# Patient Record
Sex: FEMALE | Race: BLACK OR AFRICAN AMERICAN | Employment: UNEMPLOYED | URBAN - NONMETROPOLITAN AREA
[De-identification: names, ages, dates, MRNs, and addresses within clinical notes are randomized per-mention and may not be internally consistent; named-entity substitution may affect disease eponyms.]

---

## 2022-05-19 ENCOUNTER — HOSPITAL ENCOUNTER (EMERGENCY)
Age: 22
Discharge: HOME | End: 2022-05-19
Payer: COMMERCIAL

## 2022-05-19 VITALS
OXYGEN SATURATION: 98 % | TEMPERATURE: 97.8 F | SYSTOLIC BLOOD PRESSURE: 138 MMHG | HEART RATE: 109 BPM | DIASTOLIC BLOOD PRESSURE: 84 MMHG | RESPIRATION RATE: 18 BRPM

## 2022-05-19 DIAGNOSIS — J02.9 ACUTE PHARYNGITIS, UNSPECIFIED ETIOLOGY: Primary | ICD-10-CM

## 2022-05-19 LAB
GROUP A STREP CULTURE, REFLEX: NEGATIVE
REFLEX THROAT C + S: NORMAL

## 2022-05-19 PROCEDURE — 99202 OFFICE O/P NEW SF 15 MIN: CPT | Performed by: NURSE PRACTITIONER

## 2022-05-19 PROCEDURE — 99203 OFFICE O/P NEW LOW 30 MIN: CPT

## 2022-05-19 PROCEDURE — 87880 STREP A ASSAY W/OPTIC: CPT

## 2022-05-19 PROCEDURE — 87070 CULTURE OTHR SPECIMN AEROBIC: CPT

## 2022-05-19 RX ORDER — AZITHROMYCIN 250 MG/1
TABLET, FILM COATED ORAL
Qty: 6 TABLET | Refills: 0 | Status: SHIPPED | OUTPATIENT
Start: 2022-05-19

## 2022-05-19 ASSESSMENT — ENCOUNTER SYMPTOMS
SINUS PRESSURE: 0
SHORTNESS OF BREATH: 0
NAUSEA: 0
DIARRHEA: 0
SORE THROAT: 1
BACK PAIN: 0
VOMITING: 0
COUGH: 0
RHINORRHEA: 0
TROUBLE SWALLOWING: 0

## 2022-05-19 ASSESSMENT — PAIN - FUNCTIONAL ASSESSMENT: PAIN_FUNCTIONAL_ASSESSMENT: NONE - DENIES PAIN

## 2022-05-19 NOTE — ED PROVIDER NOTES
[facility] 36  Urgent Care Encounter       CHIEF COMPLAINT       Chief Complaint   Patient presents with    Pharyngitis     x3 days       Nurses Notes reviewed and I agree except as noted in the HPI. HISTORY OF PRESENT ILLNESS   Mika Lutz is a 22 y.o. female who presents the urgent care center complaining of a sore throat for the past 3 days. Patient denies any nausea or vomiting. Patient denies any neck pain or stiffness. Patient at the present time sitting on table skin is warm and dry does not appear to be in any acute distress. The history is provided by the patient. No  was used. Pharyngitis  Location:  Generalized  Quality:  Sore  Severity:  Mild  Onset quality:  Sudden  Duration:  3 days  Timing:  Rare  Progression:  Worsening  Chronicity:  New  Relieved by:  Nothing  Worsened by:  Drinking and swallowing  Ineffective treatments:  None tried  Associated symptoms: no adenopathy, no chest pain, no chills, no cough, no ear pain, no fever, no headaches, no neck stiffness, no rash, no rhinorrhea, no shortness of breath and no trouble swallowing    Risk factors: no exposure to strep and no sick contacts        REVIEW OF SYSTEMS     Review of Systems   Constitutional: Negative for activity change, appetite change, chills, fatigue and fever. HENT: Positive for sore throat. Negative for congestion, ear pain, rhinorrhea, sinus pressure and trouble swallowing. Respiratory: Negative for cough and shortness of breath. Cardiovascular: Negative for chest pain. Gastrointestinal: Negative for diarrhea, nausea and vomiting. Musculoskeletal: Negative for back pain and neck stiffness. Skin: Negative for rash. Allergic/Immunologic: Negative for environmental allergies. Neurological: Negative for dizziness, light-headedness and headaches. Hematological: Negative for adenopathy.        PAST MEDICAL HISTORY         Diagnosis Date    Eczema     Psoriasis     Visual acuity reduced     poor visual acuity       SURGICALHISTORY     Patient  has no past surgical history on file. CURRENT MEDICATIONS       Previous Medications    CLINDAMYCIN (CLEOCIN-T) 1 % EXTERNAL SOLUTION    Apply topically 2 times daily as needed for acne. CLINDAMYCIN-BENZOYL PEROXIDE (BENZACLIN) 1-5 % GEL    Apply topically 2 times daily. ALLERGIES     Patient is is allergic to antibiotic [neomycin-polymyxin b gu] and dust mite extract. Patients   Immunization History   Administered Date(s) Administered    DTaP 2001, 01/28/2002, 08/27/2002, 12/17/2002, 03/26/2007    HPV Quadrivalent (Gardasil) 09/17/2014, 11/17/2014    Hepatitis A 07/31/2012, 09/17/2014    Hepatitis B 2001, 2001, 08/27/2002    Hib, unspecified 2001, 01/28/2002, 08/27/2002, 12/17/2002    Influenza Virus Vaccine 09/17/2014    MMR 08/27/2002, 03/26/2007    Meningococcal MCV4P (Menactra) 09/17/2014    Pneumococcal Conjugate 7-valent (Cecilia Bollard) 01/28/2002, 08/27/2002, 12/17/2002    Polio IPV (IPOL) 2001, 01/28/2002, 08/27/2002, 03/26/2007    Tdap (Boostrix, Adacel) 09/17/2014    Varicella (Varivax) 08/27/2002, 10/06/2009       FAMILY HISTORY     Patient's family history is not on file. SOCIAL HISTORY     Patient  reports that she has never smoked. She has never used smokeless tobacco. She reports that she does not drink alcohol and does not use drugs. PHYSICAL EXAM     ED TRIAGE VITALS  BP: 138/84, Temp: 97.8 °F (36.6 °C), Pulse: 109, Resp: 18, SpO2: 98 %,Estimated body mass index is 21.63 kg/m² as calculated from the following:    Height as of 12/20/16: 5' 5\" (1.651 m). Weight as of 12/20/16: 130 lb (59 kg). ,No LMP recorded. Physical Exam  Vitals and nursing note reviewed. Constitutional:       General: She is not in acute distress. Appearance: Normal appearance. She is well-developed and well-groomed.  She is not ill-appearing, toxic-appearing or diaphoretic. HENT:      Head: Normocephalic. Right Ear: Hearing, tympanic membrane, ear canal and external ear normal. No drainage, swelling or tenderness. No mastoid tenderness. Tympanic membrane is not erythematous. Left Ear: Hearing, tympanic membrane, ear canal and external ear normal. No drainage, swelling or tenderness. No mastoid tenderness. Tympanic membrane is not erythematous. Nose: Nose normal. No mucosal edema or congestion. Right Sinus: No maxillary sinus tenderness or frontal sinus tenderness. Left Sinus: No maxillary sinus tenderness or frontal sinus tenderness. Mouth/Throat:      Lips: Pink. Mouth: Mucous membranes are moist. No oral lesions or angioedema. Pharynx: Uvula midline. Posterior oropharyngeal erythema present. No uvula swelling. Tonsils: No tonsillar exudate or tonsillar abscesses. Eyes:      Conjunctiva/sclera: Conjunctivae normal.      Pupils: Pupils are equal, round, and reactive to light. Cardiovascular:      Rate and Rhythm: Regular rhythm. Tachycardia present. Heart sounds: Normal heart sounds. Pulmonary:      Effort: Pulmonary effort is normal. No accessory muscle usage. Breath sounds: Normal breath sounds. No decreased breath sounds, wheezing, rhonchi or rales. Chest:   Breasts:      Right: No supraclavicular adenopathy. Left: No supraclavicular adenopathy. Abdominal:      General: Bowel sounds are normal.      Palpations: Abdomen is soft. Tenderness: There is no abdominal tenderness. There is no right CVA tenderness, left CVA tenderness or guarding. Negative signs include Paz's sign. Musculoskeletal:         General: Normal range of motion. Cervical back: Full passive range of motion without pain, normal range of motion and neck supple. No rigidity. No muscular tenderness. Normal range of motion.    Lymphadenopathy:      Head:      Right side of head: No submental, submandibular, tonsillar, preauricular, posterior auricular or occipital adenopathy. Left side of head: No submental, submandibular, tonsillar, preauricular, posterior auricular or occipital adenopathy. Cervical: No cervical adenopathy. Right cervical: No superficial, deep or posterior cervical adenopathy. Left cervical: No superficial, deep or posterior cervical adenopathy. Upper Body:      Right upper body: No supraclavicular adenopathy. Left upper body: No supraclavicular adenopathy. Skin:     General: Skin is warm and dry. Capillary Refill: Capillary refill takes less than 2 seconds. Findings: No rash. Neurological:      Mental Status: She is alert and oriented to person, place, and time. Psychiatric:         Mood and Affect: Mood normal.         Behavior: Behavior normal. Behavior is cooperative. DIAGNOSTIC RESULTS     Labs:  Results for orders placed or performed during the hospital encounter of 05/19/22   STREP A ANTIGEN   Result Value Ref Range    GROUP A STREP CULTURE, REFLEX Negative        She declined COVID testing today      IMAGING:    No orders to display         EKG:      URGENT CARE COURSE:     Vitals:    05/19/22 1612   BP: 138/84   Pulse: 109   Resp: 18   Temp: 97.8 °F (36.6 °C)   TempSrc: Tympanic   SpO2: 98%       Medications - No data to display         PROCEDURES:  None    FINAL IMPRESSION      1. Acute pharyngitis, unspecified etiology          DISPOSITION/ PLAN      The patient was advised to take medication as directed. The patient was also advised to drink lots of fluids, monitor urine output for hydration status or dark colored urine. The patient could take Motrin or Tylenol for comfort, pain and fever. The Patient/Patient representative was advised to monitor for any changes such as fever not relieved with Motrin or Tylenol.   Also monitor for any difficulty swallowing, neck pain or stiffness, increase in swollen glands, the development of rash or any other concerns they are to dial 911 or go to the emergency department for reevaluation and further management. If the patient does not experience any of the above symptoms now to follow-up with her primary care provider for reevaluation in 3-5 days. The patient/Patient representative are agreeable to the treatment plan at this time the patient is not in acute distress and the patient left in stable condition. PATIENT REFERRED TO:  Oralia Soares MD  [address] [facility] / [address] [address]      DISCHARGE MEDICATIONS:  New Prescriptions    AZITHROMYCIN (ZITHROMAX Z-CHRISTEL) 250 MG TABLET    2 tablets day 1 then1 tablet days 2 - 5.        Discontinued Medications    No medications on file       Current Discharge Medication List          [address] [place] [address]    (Please note that portions of this note were completed with a voice recognition program. Efforts were made to edit the dictations but occasionally words are mis-transcribed.)           LETY Mitchell - CNP  05/19/22 6120

## 2022-05-21 LAB — THROAT/NOSE CULTURE: NORMAL

## 2022-09-02 ENCOUNTER — HOSPITAL ENCOUNTER (EMERGENCY)
Age: 22
Discharge: HOME | End: 2022-09-02
Payer: COMMERCIAL

## 2022-09-02 VITALS
DIASTOLIC BLOOD PRESSURE: 79 MMHG | TEMPERATURE: 97 F | SYSTOLIC BLOOD PRESSURE: 138 MMHG | OXYGEN SATURATION: 99 % | HEART RATE: 78 BPM | RESPIRATION RATE: 16 BRPM

## 2022-09-02 DIAGNOSIS — T63.444A BEE STING, UNDETERMINED INTENT, INITIAL ENCOUNTER: Primary | ICD-10-CM

## 2022-09-02 PROCEDURE — 99213 OFFICE O/P EST LOW 20 MIN: CPT

## 2022-09-02 PROCEDURE — 99213 OFFICE O/P EST LOW 20 MIN: CPT | Performed by: NURSE PRACTITIONER

## 2022-09-02 ASSESSMENT — ENCOUNTER SYMPTOMS
EYE DISCHARGE: 0
EYE REDNESS: 0
ALLERGIC/IMMUNOLOGIC NEGATIVE: 1
WHEEZING: 0
SORE THROAT: 0
VOMITING: 0
TROUBLE SWALLOWING: 0
ABDOMINAL PAIN: 0
COUGH: 0
DIARRHEA: 0
CONSTIPATION: 0
RHINORRHEA: 0
SHORTNESS OF BREATH: 0
BACK PAIN: 0
NAUSEA: 0
EYE PAIN: 0

## 2022-09-02 ASSESSMENT — PAIN - FUNCTIONAL ASSESSMENT: PAIN_FUNCTIONAL_ASSESSMENT: NONE - DENIES PAIN

## 2022-09-02 NOTE — ED TRIAGE NOTES
Patient presents to [facility] with mother with complaints of bee sting that happened today while asleep. Patient states she has a bee sting on the left index finger, and the back of the head.

## 2022-09-02 NOTE — Clinical Note
Mandi Decker was seen and treated in our emergency department on 9/2/2022. She may return to work on 09/02/2022. Patient may return to work today 5PM.      If you have any questions or concerns, please don't hesitate to call.       Maria Elena Soto, LETY - CNP

## 2022-09-02 NOTE — ED PROVIDER NOTES
[facility] 36  Urgent Care Encounter      CHIEF COMPLAINT       Chief Complaint   Patient presents with    Insect Bite     Back of the head and left index finger         Nurses Notes reviewed and I agree except as noted in the HPI. HISTORY OF PRESENT ILLNESS   Krissy Andersen is a 25 y.o. female who presents with complaint of having been started by be on her left index finger and the back of her head about 3 hours ago. Patient states she has very severe allergic reaction to bees. Is missing work today. REVIEW OF SYSTEMS     Review of Systems   Constitutional:  Negative for activity change, fatigue and fever. HENT:  Negative for congestion, ear pain, rhinorrhea, sore throat and trouble swallowing. Eyes:  Negative for pain, discharge and redness. Respiratory:  Negative for cough, shortness of breath and wheezing. Cardiovascular: Negative. Gastrointestinal:  Negative for abdominal pain, constipation, diarrhea, nausea and vomiting. Endocrine: Negative. Genitourinary:  Negative for dysuria, frequency and urgency. Musculoskeletal:  Negative for arthralgias, back pain and myalgias. Skin:  Positive for wound. Negative for rash. Allergic/Immunologic: Negative. Neurological:  Negative for dizziness, tremors, weakness and headaches. Hematological: Negative. Psychiatric/Behavioral:  Negative for dysphoric mood and sleep disturbance. The patient is not nervous/anxious. PAST MEDICAL HISTORY         Diagnosis Date    Eczema     Psoriasis     Visual acuity reduced     poor visual acuity       SURGICAL HISTORY     Patient  has no past surgical history on file.     CURRENT MEDICATIONS       Discharge Medication List as of 9/2/2022  3:45 PM        CONTINUE these medications which have NOT CHANGED    Details   azithromycin (ZITHROMAX Z-CHRISTEL) 250 MG tablet 2 tablets day 1 then1 tablet days 2 - 5., Disp-6 tablet, R-0Normal      clindamycin (CLEOCIN-T) 1 % external solution Capillary Refill: Capillary refill takes less than 2 seconds. Findings: No rash. Neurological:      Mental Status: She is alert and oriented to person, place, and time. GCS: GCS eye subscore is 4. GCS verbal subscore is 5. GCS motor subscore is 6. Cranial Nerves: No cranial nerve deficit. Coordination: Coordination normal.      Gait: Gait normal.   Psychiatric:         Mood and Affect: Mood is not anxious or depressed. Speech: Speech normal.         Behavior: Behavior normal. Behavior is not withdrawn or hyperactive. Behavior is cooperative. Thought Content: Thought content normal.         Judgment: Judgment normal.       DIAGNOSTIC RESULTS   Labs: No results found for this visit on 09/02/22. IMAGING:    URGENT CARE COURSE:     Vitals:    09/02/22 1531   BP: 138/79   Pulse: 78   Resp: 16   Temp: 97 °F (36.1 °C)   TempSrc: Tympanic   SpO2: 99%       Patient states she was stung in the center of this finger, but I am unable to ascertain any sting site. I am unable to ascertain any sting on the left parietal skull where she states she was stung. Medications - No data to display  PROCEDURES:  None  FINAL IMPRESSION      1. Bee sting, undetermined intent, initial encounter        DISPOSITION/PLAN   DISPOSITION Decision To Discharge 09/02/2022 03:43:57 PM    I reassured the patient she is not going to have a severe reaction to the bee stings and that she can return to work today. If she develops any symptoms she can take Benadryl.     PATIENT REFERRED TO:  [facility]  [address] [facility]  [phone]    As needed  DISCHARGE MEDICATIONS:  Discharge Medication List as of 9/2/2022  3:45 PM        Discharge Medication List as of 9/2/2022  3:45 PM          LETY Ortega CNP, APRN - CNP  09/02/22 0759

## 2022-10-12 ENCOUNTER — HOSPITAL ENCOUNTER (EMERGENCY)
Age: 22
Discharge: HOME | End: 2022-10-12
Attending: EMERGENCY MEDICINE
Payer: COMMERCIAL

## 2022-10-12 VITALS
SYSTOLIC BLOOD PRESSURE: 135 MMHG | HEIGHT: 64 IN | BODY MASS INDEX: 26.12 KG/M2 | DIASTOLIC BLOOD PRESSURE: 93 MMHG | TEMPERATURE: 98.9 F | OXYGEN SATURATION: 98 % | HEART RATE: 86 BPM | RESPIRATION RATE: 16 BRPM | WEIGHT: 153 LBS

## 2022-10-12 DIAGNOSIS — J06.9 VIRAL UPPER RESPIRATORY TRACT INFECTION WITH COUGH: Primary | ICD-10-CM

## 2022-10-12 PROCEDURE — 99213 OFFICE O/P EST LOW 20 MIN: CPT | Performed by: EMERGENCY MEDICINE

## 2022-10-12 PROCEDURE — 99213 OFFICE O/P EST LOW 20 MIN: CPT

## 2022-10-12 RX ORDER — CETIRIZINE HYDROCHLORIDE, PSEUDOEPHEDRINE HYDROCHLORIDE 5; 120 MG/1; MG/1
1 TABLET, FILM COATED, EXTENDED RELEASE ORAL 2 TIMES DAILY
Qty: 30 TABLET | Refills: 0 | Status: SHIPPED | OUTPATIENT
Start: 2022-10-12 | End: 2022-10-27

## 2022-10-12 RX ORDER — CETIRIZINE HYDROCHLORIDE, PSEUDOEPHEDRINE HYDROCHLORIDE 5; 120 MG/1; MG/1
1 TABLET, FILM COATED, EXTENDED RELEASE ORAL 2 TIMES DAILY
Qty: 30 TABLET | Refills: 0 | Status: SHIPPED | OUTPATIENT
Start: 2022-10-12 | End: 2022-10-12 | Stop reason: SDUPTHER

## 2022-10-12 ASSESSMENT — ENCOUNTER SYMPTOMS
DIARRHEA: 0
COUGH: 0
TROUBLE SWALLOWING: 0
EYE DISCHARGE: 0
SHORTNESS OF BREATH: 0
BLOOD IN STOOL: 0
STRIDOR: 0
CHOKING: 0
BACK PAIN: 0
EYE REDNESS: 0
EYE PAIN: 0
CONSTIPATION: 0
RHINORRHEA: 1
ABDOMINAL PAIN: 0
WHEEZING: 0
VOMITING: 0
FACIAL SWELLING: 0
NAUSEA: 0
VOICE CHANGE: 0
SINUS PRESSURE: 0
SORE THROAT: 1
ROS SKIN COMMENTS: NO RASH OR BRUISING

## 2022-10-12 ASSESSMENT — PAIN - FUNCTIONAL ASSESSMENT: PAIN_FUNCTIONAL_ASSESSMENT: NONE - DENIES PAIN

## 2022-10-12 NOTE — ED NOTES
Presents with c/o fever, hot flashes, dry cough and stuffy nose x 2 days.      Germán Rodríguez RN  10/12/22 3241

## 2022-10-12 NOTE — ED PROVIDER NOTES
[place] [address]  Urgent Care Encounter      CHIEF COMPLAINT       Chief Complaint   Patient presents with    Illness       Nurses Notes reviewed and I agree except as noted in the HPI. HISTORY OF PRESENT ILLNESS   Camacho Savage is a 25 y.o. female who presents with 2-day history of cough, congestion, clear rhinitis, postnasal drainage. No fever, vomiting, chest pain, shortness of breath, abdominal pain. Exposed to viral upper respiratory infection. No COVID exposure. Smokes cigarettes. No history of asthma and diabetes. REVIEW OF SYSTEMS     Review of Systems   Constitutional:  Positive for appetite change. Negative for chills, fatigue, fever and unexpected weight change. Decreased appetite no fever   HENT:  Positive for congestion, postnasal drip, rhinorrhea and sore throat. Negative for ear discharge, ear pain, facial swelling, hearing loss, nosebleeds, sinus pressure, trouble swallowing and voice change. Congestion, sore throat, postnasal drainage   Eyes:  Negative for pain, discharge, redness and visual disturbance. No redness or drainage   Respiratory:  Negative for cough, choking, shortness of breath, wheezing and stridor. Cough no shortness of breath   Cardiovascular:  Negative for chest pain and leg swelling. No chest pain or syncope   Gastrointestinal:  Negative for abdominal pain, blood in stool, constipation, diarrhea, nausea and vomiting. No abdominal pain or vomit   Genitourinary:  Negative for dysuria, flank pain, frequency, hematuria, urgency, vaginal bleeding and vaginal discharge. No possibility pregnancy   Musculoskeletal:  Negative for arthralgias, back pain, neck pain and neck stiffness. Skin:  Negative for rash. No rash or bruising   Neurological:  Negative for dizziness, seizures, syncope, weakness, light-headedness and headaches. No headache or lethargy   Hematological:  Negative for adenopathy. Does not bruise/bleed easily. Psychiatric/Behavioral:  Negative for confusion, sleep disturbance and suicidal ideas. The patient is not nervous/anxious. Red and bold elements. PAST MEDICAL HISTORY         Diagnosis Date    Eczema     Psoriasis     Visual acuity reduced     poor visual acuity       SURGICAL HISTORY     Patient  has no past surgical history on file. CURRENT MEDICATIONS       Previous Medications    AZITHROMYCIN (ZITHROMAX Z-CHRISTEL) 250 MG TABLET    2 tablets day 1 then1 tablet days 2 - 5. CLINDAMYCIN (CLEOCIN-T) 1 % EXTERNAL SOLUTION    Apply topically 2 times daily as needed for acne. CLINDAMYCIN-BENZOYL PEROXIDE (BENZACLIN) 1-5 % GEL    Apply topically 2 times daily. ALLERGIES     Patient is is allergic to antibiotic [neomycin-polymyxin b gu] and dust mite extract. FAMILY HISTORY     Patient'sfamily history is not on file. SOCIAL HISTORY     Patient  reports that she has never smoked. She has never used smokeless tobacco. She reports that she does not drink alcohol and does not use drugs. PHYSICAL EXAM     ED TRIAGE VITALS  BP: (!) 135/93, Temp: 98.9 °F (37.2 °C), Heart Rate: 86, Resp: 16, SpO2: 98 %  Physical Exam  Vitals and nursing note reviewed. Constitutional:       General: She is not in acute distress. Appearance: She is well-developed. She is not ill-appearing. Comments: Moist membranes   HENT:      Head: Normocephalic and atraumatic. Right Ear: Tympanic membrane and external ear normal.      Left Ear: Tympanic membrane and external ear normal.      Nose: Congestion and rhinorrhea present. Mouth/Throat:      Pharynx: No oropharyngeal exudate. Comments: Oropharynx normal  Eyes:      General: No scleral icterus. Right eye: No discharge. Left eye: No discharge. Extraocular Movements:      Right eye: Normal extraocular motion. Left eye: Normal extraocular motion.       Conjunctiva/sclera: Conjunctivae normal. Pupils: Pupils are equal, round, and reactive to light. Comments: Conjunctiva clear   Neck:      Thyroid: No thyromegaly. Vascular: No JVD. Comments: No Meningismus  Cardiovascular:      Rate and Rhythm: Normal rate and regular rhythm. Pulses: Normal pulses. Heart sounds: Normal heart sounds, S1 normal and S2 normal. No murmur heard. No friction rub. No gallop. Comments: No murmur  Pulmonary:      Effort: Pulmonary effort is normal. No tachypnea or respiratory distress. Breath sounds: Normal breath sounds. No stridor. No decreased breath sounds, wheezing, rhonchi or rales. Comments: dry cough lungs clear  Chest:      Chest wall: No tenderness. Abdominal:      General: Bowel sounds are normal. There is no distension. Palpations: Abdomen is soft. There is no mass. Tenderness: There is no abdominal tenderness. There is no guarding or rebound. Musculoskeletal:         General: No tenderness. Normal range of motion. Cervical back: Normal range of motion. Comments: Extremities normal   Lymphadenopathy:      Cervical: No cervical adenopathy. Right cervical: No superficial or deep cervical adenopathy. Left cervical: No superficial or deep cervical adenopathy. Skin:     General: Skin is warm and dry. Findings: No erythema or rash. Comments: No rash or bruising   Neurological:      Mental Status: She is alert and oriented to person, place, and time. Cranial Nerves: No cranial nerve deficit. Motor: No abnormal muscle tone. Coordination: Coordination normal.      Deep Tendon Reflexes: Reflexes are normal and symmetric. Reflexes normal.      Comments: Appropriate no focal finding   Psychiatric:         Behavior: Behavior normal.         Thought Content: Thought content normal.         Judgment: Judgment normal.       DIAGNOSTIC RESULTS   Labs:No results found for this visit on 10/12/22.     IMAGING:  No orders to display URGENT CARE COURSE:     Vitals:    10/12/22 1604   BP: (!) 135/93   Pulse: 86   Resp: 16   Temp: 98.9 °F (37.2 °C)   TempSrc: Temporal   SpO2: 98%   Weight: 153 lb (69.4 kg)   Height: 5' 4\" (1.626 m)       Medications - No data to display  PROCEDURES:  None  FINAL IMPRESSION      1. Viral upper respiratory tract infection with cough        DISPOSITION/PLAN   DISPOSITION Decision To Discharge 10/12/2022 04:29:40 PM  Nontoxic, well hydrated, nl airway. No airway abscess or epiglottitis, sepsis, CNS infection, pneumonia, hypoxia, bronchospasm. No bacterial infection. Patient has viral upper respiratory infection. Will treat with Zyrtec-D, Tylenol, increased oral clear liquids, rest.  Patient follow-up with PCP in 5 days if problems persist, and she understands to go to ED if worse.   PATIENT REFERRED TO:  Wil Johnson MD  [address]  [phone]    Schedule an appointment as soon as possible for a visit in 5 days  Recheck if problems persist, go to emergency if worse  DISCHARGE MEDICATIONS:  New Prescriptions    CETIRIZINE-PSUEDOEPHEDRINE (ZYRTEC-D) 5-120 MG PER EXTENDED RELEASE TABLET    Take 1 tablet by mouth 2 times daily for 30 doses 1 p.o. twice daily for sinus pain and pressure, congestion, postnasal drainage, ear pain and pressure, cough     Current Discharge Medication List          MD Jamey Olivas MD  10/12/22 0861

## 2022-10-31 ENCOUNTER — HOSPITAL ENCOUNTER (EMERGENCY)
Age: 22
Discharge: HOME | End: 2022-10-31
Payer: COMMERCIAL

## 2022-10-31 VITALS
HEART RATE: 74 BPM | TEMPERATURE: 98 F | SYSTOLIC BLOOD PRESSURE: 136 MMHG | RESPIRATION RATE: 20 BRPM | DIASTOLIC BLOOD PRESSURE: 76 MMHG | OXYGEN SATURATION: 100 %

## 2022-10-31 DIAGNOSIS — M25.511 ACUTE PAIN OF RIGHT SHOULDER: Primary | ICD-10-CM

## 2022-10-31 PROCEDURE — 99213 OFFICE O/P EST LOW 20 MIN: CPT

## 2022-10-31 PROCEDURE — 99213 OFFICE O/P EST LOW 20 MIN: CPT | Performed by: NURSE PRACTITIONER

## 2022-10-31 RX ORDER — IBUPROFEN 600 MG/1
600 TABLET ORAL 4 TIMES DAILY PRN
Qty: 20 TABLET | Refills: 0 | Status: SHIPPED | OUTPATIENT
Start: 2022-10-31 | End: 2022-11-05

## 2022-10-31 ASSESSMENT — ENCOUNTER SYMPTOMS
ABDOMINAL PAIN: 0
SHORTNESS OF BREATH: 0
NAUSEA: 0
EYES NEGATIVE: 1
RHINORRHEA: 0
SINUS PAIN: 0
VOMITING: 0
DIARRHEA: 0
SORE THROAT: 0
WHEEZING: 0
SINUS PRESSURE: 0
CONSTIPATION: 0

## 2022-10-31 NOTE — Clinical Note
Tien Dowd was seen and treated in our emergency department on 10/31/2022. She may return to work on 11/01/2022. If you have any questions or concerns, please don't hesitate to call.       Mya Fragoso, LETY - CNP

## 2022-10-31 NOTE — ED PROVIDER NOTES
[address]       Chief Complaint   Patient presents with    Back Pain    Shoulder Pain       Nurses Notes reviewed and I agree except as noted in the HPI. HISTORY OF PRESENT ILLNESS   Harley Lira is a 25 y.o. female who presents to care today complaining of some right shoulder pain and right lateral rib pain. She denies any injuries. She denies cough. Denies pain with range of motion. States that it is somewhat worse when she takes a deep breath. No fever body aches chills. No shortness of breath. REVIEW OF SYSTEMS     Review of Systems   Constitutional:  Negative for chills, fatigue, fever and unexpected weight change. HENT:  Negative for congestion, postnasal drip, rhinorrhea, sinus pressure, sinus pain, sneezing and sore throat. Eyes: Negative. Respiratory:  Negative for shortness of breath and wheezing. Cardiovascular:  Negative for chest pain. Gastrointestinal:  Negative for abdominal pain, constipation, diarrhea, nausea and vomiting. Endocrine: Negative. Genitourinary:  Negative for difficulty urinating, dyspareunia, dysuria, hematuria, urgency, vaginal bleeding, vaginal discharge and vaginal pain. Musculoskeletal:  Negative for myalgias. Right shoulder pain. Skin:  Negative for rash. Neurological:  Negative for dizziness, light-headedness and headaches. Hematological:  Negative for adenopathy. Psychiatric/Behavioral:  Negative for agitation. PAST MEDICAL HISTORY         Diagnosis Date    Eczema     Psoriasis     Visual acuity reduced     poor visual acuity       SURGICAL HISTORY     Patient  has no past surgical history on file.     CURRENT MEDICATIONS       Discharge Medication List as of 10/31/2022  1:01 PM        CONTINUE these medications which have NOT CHANGED    Details   azithromycin (ZITHROMAX Z-CHRISTEL) 250 MG tablet 2 tablets day 1 then1 tablet days 2 - 5., Disp-6 tablet, R-0Normal clindamycin (CLEOCIN-T) 1 % external solution Apply topically 2 times daily as needed for acne., Disp-30 mL, R-5, Normal      clindamycin-benzoyl peroxide (BENZACLIN) 1-5 % gel Apply topically 2 times daily. , Disp-30 g, R-5, Normal             ALLERGIES     Patient is is allergic to antibiotic [neomycin-polymyxin b gu] and dust mite extract. FAMILY HISTORY     Patient'sfamily history is not on file. SOCIAL HISTORY     Patient  reports that she has never smoked. She has never used smokeless tobacco. She reports that she does not drink alcohol and does not use drugs. PHYSICAL EXAM     ED TRIAGE VITALS  BP: 136/76, Temp: 98 °F (36.7 °C), Heart Rate: 74, Resp: 20, SpO2: 100 %  Physical Exam  Vitals and nursing note reviewed. Constitutional:       General: She is not in acute distress. Appearance: Normal appearance. She is not ill-appearing or toxic-appearing. HENT:      Head: Normocephalic and atraumatic. Nose: No congestion or rhinorrhea. Mouth/Throat:      Mouth: Mucous membranes are moist.      Pharynx: Oropharynx is clear. Eyes:      Extraocular Movements: Extraocular movements intact. Conjunctiva/sclera: Conjunctivae normal.      Pupils: Pupils are equal, round, and reactive to light. Cardiovascular:      Rate and Rhythm: Normal rate and regular rhythm. Pulses: Normal pulses. Heart sounds: Normal heart sounds. No murmur heard. Pulmonary:      Effort: Pulmonary effort is normal. No respiratory distress. Breath sounds: Normal breath sounds. No wheezing. Abdominal:      General: Abdomen is flat. Palpations: Abdomen is soft. Tenderness: There is no abdominal tenderness. Musculoskeletal:         General: No swelling or deformity. Normal range of motion. Cervical back: Normal range of motion and neck supple. Skin:     General: Skin is warm and dry. Capillary Refill: Capillary refill takes less than 2 seconds. Findings: No rash. Neurological:      General: No focal deficit present. Mental Status: She is alert and oriented to person, place, and time. Mental status is at baseline. Psychiatric:         Mood and Affect: Mood normal.         Behavior: Behavior normal.         Thought Content: Thought content normal.         Judgment: Judgment normal.       DIAGNOSTIC RESULTS   Labs:No results found for this visit on 10/31/22. IMAGING:  No orders to display     URGENT CARE COURSE:         Medications - No data to display  PROCEDURES:  FINALIMPRESSION      1. Acute pain of right shoulder        DISPOSITION/PLAN   DISPOSITION Decision To Discharge 10/31/2022 12:55:22 PM    Non-ill-appearing 61-year-old female. She has had no recent injury or falls. No recent cough. No fever. Lungs are clear to auscultation bilaterally. Pain is not reproducible with palpation or range of motion. Will prescribe nonsteroidal anti-inflammatories for suspected pleuritic rib pain. Follow-up with primary care provider. Report to ER with new or severe symptoms.     PAT[facility]:  [phone]      As needed  DISCHARGE MEDICATIONS:  Discharge Medication List as of 10/31/2022  1:01 PM        START taking these medications    Details   ibuprofen (ADVIL;MOTRIN) 600 MG tablet Take 1 tablet by mouth 4 times daily as needed for Pain, Disp-20 tablet, R-0Normal           Discharge Medication List as of 10/31/2022  1:01 PM          Nathan Sal, LETY - ALANA Almazan, APRN - CNP  10/31/22 [address] [facility], APRN - CNP  11/15/22 [id number]

## 2022-11-28 ENCOUNTER — HOSPITAL ENCOUNTER (EMERGENCY)
Age: 22
Discharge: HOME | End: 2022-11-28
Payer: COMMERCIAL

## 2022-11-28 VITALS
DIASTOLIC BLOOD PRESSURE: 73 MMHG | OXYGEN SATURATION: 100 % | RESPIRATION RATE: 20 BRPM | HEART RATE: 87 BPM | TEMPERATURE: 97.5 F | SYSTOLIC BLOOD PRESSURE: 123 MMHG

## 2022-11-28 DIAGNOSIS — J01.00 ACUTE MAXILLARY SINUSITIS, RECURRENCE NOT SPECIFIED: Primary | ICD-10-CM

## 2022-11-28 PROCEDURE — 99213 OFFICE O/P EST LOW 20 MIN: CPT

## 2022-11-28 PROCEDURE — 99212 OFFICE O/P EST SF 10 MIN: CPT | Performed by: NURSE PRACTITIONER

## 2022-11-28 RX ORDER — DEXTROMETHORPHAN HYDROBROMIDE AND PROMETHAZINE HYDROCHLORIDE 15; 6.25 MG/5ML; MG/5ML
5 SYRUP ORAL 4 TIMES DAILY PRN
Qty: 100 ML | Refills: 0 | Status: SHIPPED | OUTPATIENT
Start: 2022-11-28 | End: 2022-12-03

## 2022-11-28 RX ORDER — CETIRIZINE HYDROCHLORIDE 10 MG/1
10 TABLET ORAL DAILY
Qty: 30 TABLET | Refills: 0 | Status: SHIPPED | OUTPATIENT
Start: 2022-11-28 | End: 2022-12-28

## 2022-11-28 RX ORDER — AMOXICILLIN 500 MG/1
500 CAPSULE ORAL 2 TIMES DAILY
Qty: 14 CAPSULE | Refills: 0 | Status: SHIPPED | OUTPATIENT
Start: 2022-11-28 | End: 2022-12-05

## 2022-11-28 ASSESSMENT — ENCOUNTER SYMPTOMS
CHOKING: 0
RHINORRHEA: 1
STRIDOR: 0
APNEA: 0
CHEST TIGHTNESS: 0
SWOLLEN GLANDS: 0
SINUS PRESSURE: 1
WHEEZING: 0
COUGH: 1
SINUS PAIN: 0
SHORTNESS OF BREATH: 0
SORE THROAT: 1

## 2022-11-28 NOTE — Clinical Note
Valentine Dubon was seen and treated in our emergency department on 11/28/2022. She may return to work on 11/29/2022. If you have any questions or concerns, please don't hesitate to call.       Samantha Dent, LETY - CNP

## 2022-11-28 NOTE — ED PROVIDER NOTES
[place] [id number]  Urgent Care Encounter      CHIEF COMPLAINT       Chief Complaint   Patient presents with    Nasal Congestion    Cough       Nurses Notes reviewed and I agree except as noted in the HPI. HISTORY OFPRESENT ILLNESS   Xuan Mckeon is a 25 y.o. The history is provided by the patient. No  was used. URI  Presenting symptoms: congestion, cough, rhinorrhea and sore throat    Presenting symptoms: no ear pain, no facial pain, no fatigue and no fever    Sore throat:     Severity:  Moderate    Onset quality:  Gradual    Duration:  2 weeks    Timing:  Constant    Progression:  Waxing and waning  Severity:  Moderate  Onset quality:  Sudden  Timing:  Constant  Progression:  Waxing and waning  Chronicity:  New  Relieved by:  Nothing  Worsened by:  Certain positions  Ineffective treatments:  OTC medications  Associated symptoms: headaches    Associated symptoms: no arthralgias, no myalgias, no neck pain, no sinus pain, no sneezing, no swollen glands and no wheezing    Risk factors: not elderly, no chronic cardiac disease, no chronic kidney disease, no chronic respiratory disease, no diabetes mellitus, no immunosuppression, no recent illness, no recent travel and no sick contacts      REVIEW OF SYSTEMS     Review of Systems   Constitutional:  Negative for activity change, appetite change, chills, diaphoresis, fatigue and fever. HENT:  Positive for congestion, postnasal drip, rhinorrhea, sinus pressure and sore throat. Negative for ear pain, sinus pain and sneezing. Respiratory:  Positive for cough. Negative for apnea, choking, chest tightness, shortness of breath, wheezing and stridor. Cardiovascular:  Negative for chest pain, palpitations and leg swelling. Musculoskeletal:  Negative for arthralgias, myalgias and neck pain. Neurological:  Positive for headaches. Negative for dizziness and light-headedness.      PAST MEDICAL HISTORY         Diagnosis Date Eczema     Psoriasis     Visual acuity reduced     poor visual acuity       SURGICAL HISTORY     Patient  has no past surgical history on file. CURRENT MEDICATIONS       Discharge Medication List as of 11/28/2022  4:06 PM        CONTINUE these medications which have NOT CHANGED    Details   ibuprofen (ADVIL;MOTRIN) 600 MG tablet Take 1 tablet by mouth 4 times daily as needed for Pain, Disp-20 tablet, R-0Normal             ALLERGIES     Patient is is allergic to antibiotic [neomycin-polymyxin b gu] and dust mite extract. FAMILY HISTORY     Patient's family history is not on file. SOCIAL HISTORY     Patient  reports that she has never smoked. She has never used smokeless tobacco. She reports that she does not drink alcohol and does not use drugs. PHYSICAL EXAM     ED TRIAGE VITALS  BP: 123/73, Temp: 97.5 °F (36.4 °C), Heart Rate: 87, Resp: 20, SpO2: 100 %  Physical Exam  Vitals and nursing note reviewed. Constitutional:       General: She is not in acute distress. Appearance: Normal appearance. She is normal weight. She is not ill-appearing, toxic-appearing or diaphoretic. HENT:      Head: Normocephalic and atraumatic. Right Ear: External ear normal.      Left Ear: External ear normal.      Nose: Nose normal.   Eyes:      Extraocular Movements: Extraocular movements intact. Conjunctiva/sclera: Conjunctivae normal.   Pulmonary:      Effort: Pulmonary effort is normal.   Musculoskeletal:         General: Normal range of motion. Cervical back: Normal range of motion. Skin:     General: Skin is warm. Neurological:      General: No focal deficit present. Mental Status: She is alert and oriented to person, place, and time. Psychiatric:         Mood and Affect: Mood normal.         Behavior: Behavior normal.         Thought Content:  Thought content normal.         Judgment: Judgment normal.       DIAGNOSTIC RESULTS   Labs:No results found for this visit on 11/28/22. IMAGING:  No orders to display     URGENT CARE COURSE:     Vitals:    11/28/22 1544   BP: 123/73   Pulse: 87   Resp: 20   Temp: 97.5 °F (36.4 °C)   SpO2: 100%       Medications - No data to display  PROCEDURES:  None  FINAL IMPRESSION      1. Acute maxillary sinusitis, recurrence not specified        DISPOSITION/PLAN   Decision To Discharge    Drink lots of fluids  Take Motrin or Tylenol for headache  Humidification of air  Use nasal spray as directed  Take a nasal decongestant as directed  Monitor for any fever increased and sinus pain or pressure  Follow-up see her primary care provider in 48 hours  Or go to the emergency department for any changes or concerns.      PATIENT REFERRED TO:  Marah Mayo MD  [address] [place] [address]  [phone]    Schedule an appointment as soon as possible for a visit     DISCHARGE MEDICATIONS:  Discharge Medication List as of 11/28/2022  4:06 PM        START taking these medications    Details   amoxicillin (AMOXIL) 500 MG capsule Take 1 capsule by mouth 2 times daily for 7 days, Disp-14 capsule, R-0Normal      promethazine-dextromethorphan (PROMETHAZINE-DM) 6.25-15 MG/5ML syrup Take 5 mLs by mouth 4 times daily as needed for Cough, Disp-100 mL, R-0Normal      cetirizine (ZYRTEC ALLERGY) 10 MG tablet Take 1 tablet by mouth daily, Disp-30 tablet, R-0Normal           Discharge Medication List as of 11/28/2022  4:06 PM          Valentino Nicks, APRN - CNP Valentino Nicks, APRN - CNP  11/28/22 3498

## 2023-03-17 ENCOUNTER — APPOINTMENT (OUTPATIENT)
Dept: GENERAL RADIOLOGY | Age: 23
End: 2023-03-17
Payer: COMMERCIAL

## 2023-03-17 ENCOUNTER — HOSPITAL ENCOUNTER (EMERGENCY)
Age: 23
Discharge: HOME | End: 2023-03-17
Payer: COMMERCIAL

## 2023-03-17 VITALS
TEMPERATURE: 98.5 F | OXYGEN SATURATION: 98 % | WEIGHT: 161 LBS | HEART RATE: 78 BPM | BODY MASS INDEX: 28.53 KG/M2 | DIASTOLIC BLOOD PRESSURE: 83 MMHG | SYSTOLIC BLOOD PRESSURE: 124 MMHG | HEIGHT: 63 IN | RESPIRATION RATE: 16 BRPM
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
## 2023-03-17 DIAGNOSIS — S50.11XA CONTUSION OF RIGHT FOREARM, INITIAL ENCOUNTER: ICD-10-CM

## 2023-03-17 DIAGNOSIS — S70.01XA CONTUSION OF RIGHT HIP, INITIAL ENCOUNTER: ICD-10-CM

## 2023-03-17 PROCEDURE — 99213 OFFICE O/P EST LOW 20 MIN: CPT

## 2023-03-17 PROCEDURE — 73090 X-RAY EXAM OF FOREARM: CPT

## 2023-03-17 RX ORDER — CYCLOBENZAPRINE HCL 10 MG
10 TABLET ORAL 2 TIMES DAILY PRN
Qty: 10 TABLET | Refills: 0 | Status: SHIPPED | OUTPATIENT
Start: 2023-03-17 | End: 2023-03-22

## 2023-03-17 ASSESSMENT — ENCOUNTER SYMPTOMS: SHORTNESS OF BREATH: 0

## 2023-03-17 ASSESSMENT — PAIN DESCRIPTION - DESCRIPTORS: DESCRIPTORS: SORE

## 2023-03-17 ASSESSMENT — PAIN - FUNCTIONAL ASSESSMENT
PAIN_FUNCTIONAL_ASSESSMENT: PREVENTS OR INTERFERES SOME ACTIVE ACTIVITIES AND ADLS
PAIN_FUNCTIONAL_ASSESSMENT: 0-10

## 2023-03-17 ASSESSMENT — PAIN DESCRIPTION - ORIENTATION: ORIENTATION: RIGHT

## 2023-03-17 ASSESSMENT — PAIN SCALES - GENERAL: PAINLEVEL_OUTOF10: 7

## 2023-03-17 ASSESSMENT — PAIN DESCRIPTION - PAIN TYPE: TYPE: ACUTE PAIN

## 2023-03-17 ASSESSMENT — PAIN DESCRIPTION - ONSET: ONSET: GRADUAL

## 2023-03-17 ASSESSMENT — PAIN DESCRIPTION - FREQUENCY: FREQUENCY: CONTINUOUS

## 2023-03-17 ASSESSMENT — PAIN DESCRIPTION - LOCATION: LOCATION: HIP

## 2023-03-17 NOTE — Clinical Note
Ladonna Gonzales was seen and treated in our emergency department on 3/17/2023. She may return to work on 03/18/2023. If you have any questions or concerns, please don't hesitate to call.       Kaye Regalado, LETY - CNP

## 2023-03-17 NOTE — DISCHARGE INSTRUCTIONS
Tylenol and Motrin for pain and discomfort  ICE or Heat as needed  Muscle relaxer as needed  Follow up with PCP or [facility]IO 3-5 days as needed  Return to emergency services for new or worsening symptoms

## 2023-03-17 NOTE — ED TRIAGE NOTES
Pt to [facility] ambulatory with a fall. Pt had no LOC. Pt stated she fell on Wednesday. Pt c/o right lower arm pain, and right hip pain. Pt has applied ice to sore areas.

## 2023-03-17 NOTE — ED PROVIDER NOTES
[place] [address]  Urgent Care Encounter       CHIEF COMPLAINT       Chief Complaint   Patient presents with    Fall    Arm Pain     Right lower arm    Hip Pain     Right hip       Nurses Notes reviewed and I agree except as noted in the HPI. HISTORY OF PRESENT ILLNESS   Anny Flores is a 25 y.o. female who presents complaints of a fall on Wednesday. Patient states she was cleaning out her dog cage, and fell down 5 steps. Denies any dizziness, or lightheadedness prior to the fall, patient states that she fell because she simply tripped on the stairs. Patient rates pain 7 out of 10 at this time and right forearm as well as right hip. Reports taking Advil last night. Denies loss of consciousness during fall. The history is provided by the patient. REVIEW OF SYSTEMS     Review of Systems   Constitutional:  Negative for chills and fatigue. Respiratory:  Negative for shortness of breath. Cardiovascular:  Negative for chest pain. Musculoskeletal:  Positive for myalgias. Neurological:  Negative for dizziness, syncope and headaches. PAST MEDICAL HISTORY         Diagnosis Date    Eczema     Psoriasis     Visual acuity reduced     poor visual acuity       SURGICALHISTORY     Patient  has no past surgical history on file. CURRENT MEDICATIONS       Previous Medications    IBUPROFEN (ADVIL;MOTRIN) 600 MG TABLET    Take 1 tablet by mouth 4 times daily as needed for Pain       ALLERGIES     Patient is is allergic to antibiotic [neomycin-polymyxin b gu] and dust mite extract.     Patients   Immunization History   Administered Date(s) Administered    DTaP 2001, 01/28/2002, 08/27/2002, 12/17/2002, 03/26/2007    HPV Quadrivalent (Gardasil) 09/17/2014, 11/17/2014    Hepatitis A 07/31/2012, 09/17/2014    Hepatitis B 2001, 2001, 08/27/2002    Hib, unspecified 2001, 01/28/2002, 08/27/2002, 12/17/2002    Influenza Virus Vaccine 09/17/2014    MMR 08/27/2002, 03/26/2007    Meningococcal MCV4P (Menactra) 09/17/2014    Pneumococcal Conjugate 7-valent (Christiana Pena) 01/28/2002, 08/27/2002, 12/17/2002    Polio IPV (IPOL) 2001, 01/28/2002, 08/27/2002, 03/26/2007    Tdap (Boostrix, Adacel) 09/17/2014    Varicella (Varivax) 08/27/2002, 10/06/2009       FAMILY HISTORY     Patient's family history is not on file. SOCIAL HISTORY     Patient  reports that she has never smoked. She has been exposed to tobacco smoke. She has never used smokeless tobacco. She reports that she does not drink alcohol and does not use drugs. PHYSICAL EXAM     ED TRIAGE VITALS  BP: 124/83, Temp: 98.5 °F (36.9 °C), Heart Rate: 78, Resp: 16, SpO2: 98 %,Estimated body mass index is 28.52 kg/m² as calculated from the following:    Height as of this encounter: 5' 3\" (1.6 m). Weight as of this encounter: 161 lb (73 kg). ,No LMP recorded. Physical Exam  Vitals and nursing note reviewed. Constitutional:       Appearance: She is normal weight. HENT:      Head: Normocephalic. Eyes:      Pupils: Pupils are equal, round, and reactive to light. Cardiovascular:      Rate and Rhythm: Normal rate and regular rhythm. Heart sounds: Normal heart sounds. Pulmonary:      Effort: Pulmonary effort is normal.      Breath sounds: Normal breath sounds. Musculoskeletal:         General: Tenderness and signs of injury present. Right elbow: Normal range of motion. Right forearm: Tenderness present. Right wrist: Normal range of motion. Right hand: Normal strength. Arms:       Right hip: Tenderness present. Normal strength. Legs:    Skin:     General: Skin is warm and dry. Neurological:      Mental Status: She is alert. DIAGNOSTIC RESULTS     Labs:No results found for this visit on 03/17/23.     IMAGING:    XR RADIUS ULNA RIGHT (2 VIEWS)    (Results Pending)         EKG:      URGENT CARE COURSE:     Vitals:    03/17/23 0809   BP: 124/83   Pulse: 78   Resp: 16 Temp: 98.5 °F (36.9 °C)   TempSrc: Temporal   SpO2: 98%   Weight: 161 lb (73 kg)   Height: 5' 3\" (1.6 m)       Medications - No data to display         PROCEDURES:  None    FINAL IMPRESSION      1. Fall from standing, initial encounter    2. Contusion of right forearm, initial encounter    3. Contusion of right hip, initial encounter          DISPOSITION/ PLAN   Diagnosed with contusion of right forearm and right hip, as well as a fall from standing. Patient educated use Tylenol, Motrin, and muscle relaxer as needed. Educated to not drive or use heavy machinery while using muscle relaxer. Patient educated use ice or heat as needed. Follow-up with PCP or ar[facility]o in 3 to 5 days as needed. Return to emergency department for new or worsening symptoms. Denies any questions or concerns at this time.         PATIENT REFERRED TO:  Irene Yusuf MD  [address] [facility]ONATH[facility] LOU [facility] [id number]      DISCHARGE MEDICATIONS:  New Prescriptions    CYCLOBENZAPRINE (FLEXERIL) 10 MG TABLET    Take 1 tablet by mouth 2 times daily as needed for Muscle spasms       Discontinued Medications    No medications on file       Current Discharge Medication List          LETY Webb CNP    (Please note that portions of this note were completed with a voice recognition program. Efforts were made to edit the dictations but occasionally words are mis-transcribed.)            LETY Webb CNP  03/17/23 0265

## 2023-03-29 ENCOUNTER — HOSPITAL ENCOUNTER (EMERGENCY)
Age: 23
Discharge: HOME | End: 2023-03-29
Payer: COMMERCIAL

## 2023-03-29 VITALS
OXYGEN SATURATION: 98 % | HEART RATE: 81 BPM | DIASTOLIC BLOOD PRESSURE: 81 MMHG | RESPIRATION RATE: 20 BRPM | SYSTOLIC BLOOD PRESSURE: 151 MMHG | WEIGHT: 165 LBS | TEMPERATURE: 98.1 F | BODY MASS INDEX: 29.23 KG/M2

## 2023-03-29 DIAGNOSIS — R59.0 LYMPHADENOPATHY OF LEFT CERVICAL REGION: Primary | ICD-10-CM

## 2023-03-29 PROCEDURE — 99213 OFFICE O/P EST LOW 20 MIN: CPT

## 2023-03-29 PROCEDURE — 99212 OFFICE O/P EST SF 10 MIN: CPT | Performed by: NURSE PRACTITIONER

## 2023-03-29 ASSESSMENT — ENCOUNTER SYMPTOMS
ABDOMINAL PAIN: 0
SHORTNESS OF BREATH: 0
EYES NEGATIVE: 1
WHEEZING: 0
VOMITING: 0
DIARRHEA: 0
CONSTIPATION: 0
RHINORRHEA: 0
SORE THROAT: 0
SINUS PRESSURE: 0
SINUS PAIN: 0
NAUSEA: 0

## 2023-03-29 ASSESSMENT — PAIN - FUNCTIONAL ASSESSMENT: PAIN_FUNCTIONAL_ASSESSMENT: NONE - DENIES PAIN

## 2023-03-29 NOTE — ED TRIAGE NOTES
Patient to room with c/o cyst to posterior neck first noticed yesterday. No drainage. Denies pain. Requests work excuse.

## 2023-03-29 NOTE — Clinical Note
Silverio Peters was seen and treated in our emergency department on 3/29/2023. She may return to work on 03/30/2023. If you have any questions or concerns, please don't hesitate to call.       Joannie Rinne, LETY - CNP

## 2023-03-29 NOTE — ED PROVIDER NOTES
extract. FAMILY HISTORY     Patient'sfamily history is not on file. SOCIAL HISTORY     Patient  reports that she has never smoked. She has been exposed to tobacco smoke. She has never used smokeless tobacco. She reports that she does not drink alcohol and does not use drugs. PHYSICAL EXAM     ED TRIAGE VITALS  BP: (!) 151/81, Temp: 98.1 °F (36.7 °C), Heart Rate: 81, Resp: 20, SpO2: 98 %  Physical Exam  Vitals and nursing note reviewed. Constitutional:       General: She is not in acute distress. Appearance: Normal appearance. She is not ill-appearing or toxic-appearing. HENT:      Head: Normocephalic and atraumatic. Comments: Patient has enlarged lymph node to the left cervical chain. No tenderness. No pain. No erythema. Nose: No congestion or rhinorrhea. Mouth/Throat:      Mouth: Mucous membranes are moist.      Pharynx: Oropharynx is clear. Eyes:      Extraocular Movements: Extraocular movements intact. Conjunctiva/sclera: Conjunctivae normal.      Pupils: Pupils are equal, round, and reactive to light. Cardiovascular:      Rate and Rhythm: Normal rate and regular rhythm. Pulses: Normal pulses. Heart sounds: Normal heart sounds. No murmur heard. Pulmonary:      Effort: Pulmonary effort is normal. No respiratory distress. Breath sounds: Normal breath sounds. No wheezing. Abdominal:      General: Abdomen is flat. Palpations: Abdomen is soft. Tenderness: There is no abdominal tenderness. Musculoskeletal:         General: No swelling or deformity. Normal range of motion. Cervical back: Normal range of motion and neck supple. Lymphadenopathy:      Cervical: Cervical adenopathy present. Skin:     General: Skin is warm and dry. Capillary Refill: Capillary refill takes less than 2 seconds. Findings: No rash. Neurological:      General: No focal deficit present.       Mental Status: She is alert and oriented to person, place, and

## 2023-04-29 ENCOUNTER — HOSPITAL ENCOUNTER (EMERGENCY)
Age: 23
Discharge: HOME | End: 2023-04-29
Payer: COMMERCIAL

## 2023-04-29 VITALS
SYSTOLIC BLOOD PRESSURE: 122 MMHG | HEART RATE: 75 BPM | OXYGEN SATURATION: 99 % | BODY MASS INDEX: 29.49 KG/M2 | DIASTOLIC BLOOD PRESSURE: 84 MMHG | WEIGHT: 166.5 LBS | RESPIRATION RATE: 16 BRPM | TEMPERATURE: 97.9 F

## 2023-04-29 DIAGNOSIS — B34.9 VIRAL ILLNESS: Primary | ICD-10-CM

## 2023-04-29 LAB
FLUAV AG SPEC QL: NEGATIVE
FLUBV AG SPEC QL: NEGATIVE
S PYO AG THROAT QL: NEGATIVE

## 2023-04-29 PROCEDURE — 99213 OFFICE O/P EST LOW 20 MIN: CPT

## 2023-04-29 PROCEDURE — 87804 INFLUENZA ASSAY W/OPTIC: CPT

## 2023-04-29 PROCEDURE — 99213 OFFICE O/P EST LOW 20 MIN: CPT | Performed by: NURSE PRACTITIONER

## 2023-04-29 PROCEDURE — 87651 STREP A DNA AMP PROBE: CPT

## 2023-04-29 RX ORDER — ACETAMINOPHEN 325 MG/1
650 TABLET ORAL EVERY 6 HOURS PRN
COMMUNITY

## 2023-04-29 ASSESSMENT — ENCOUNTER SYMPTOMS
DIARRHEA: 0
EYE REDNESS: 0
EYE DISCHARGE: 0
SORE THROAT: 0
RHINORRHEA: 0
COUGH: 0
NAUSEA: 0
VOMITING: 0
ABDOMINAL PAIN: 1
SHORTNESS OF BREATH: 0
TROUBLE SWALLOWING: 0

## 2023-04-29 ASSESSMENT — PAIN - FUNCTIONAL ASSESSMENT: PAIN_FUNCTIONAL_ASSESSMENT: NONE - DENIES PAIN

## 2023-04-29 NOTE — ED PROVIDER NOTES
[facility]      CHIEF COMPLAINT       Chief Complaint   Patient presents with    Abdominal Pain    Headache    Neck Pain    Otalgia     Outside of right ear       Nurses Notes reviewed and I agree except as noted in the HPI. HISTORY OF PRESENT ILLNESS   Judy Duff is a 25 y.o. female who presents for right ear pain, headache, neck pain, and abdominal pain. Started approximately 3 days ago. Patient states she was exposed to her girlfriend who has influenza B. She denies fever, body aches, headaches, vomiting, or diarrhea. She has had mild nausea but is able to keep fluids down okay. She did miss work today. REVIEW OF SYSTEMS     Review of Systems   Constitutional:  Negative for chills, diaphoresis, fatigue and fever. HENT:  Positive for ear pain. Negative for congestion, rhinorrhea, sore throat and trouble swallowing. Eyes:  Negative for discharge and redness. Respiratory:  Negative for cough and shortness of breath. Cardiovascular:  Negative for chest pain. Gastrointestinal:  Positive for abdominal pain. Negative for diarrhea, nausea and vomiting. Genitourinary:  Negative for decreased urine volume. Musculoskeletal:  Positive for neck pain. Negative for neck stiffness. Skin:  Negative for rash. Neurological:  Positive for headaches. Hematological:  Negative for adenopathy. Psychiatric/Behavioral:  Negative for sleep disturbance. PAST MEDICAL HISTORY         Diagnosis Date    Eczema     Psoriasis     Visual acuity reduced     poor visual acuity       SURGICAL HISTORY     Patient  has no past surgical history on file.     CURRENT MEDICATIONS       Previous Medications    ACETAMINOPHEN (TYLENOL) 325 MG TABLET    Take 2 tablets by mouth every 6 hours as needed for Pain    IBUPROFEN (ADVIL;MOTRIN) 600 MG TABLET    Take 1 tablet by mouth 4 times daily as needed for Pain       ALLERGIES     Patient is is allergic to antibiotic

## 2023-04-29 NOTE — DISCHARGE INSTRUCTIONS
Treat the symptoms by making sure you are drinking fluids and you are well-rested. You may take Tylenol or Motrin per package instructions, unless otherwise directed. Seek emergency medical treatment for fever >101.5 for 3 days, unable to eat or urinate for 6 hours, increase in current symptoms or for new or worrisome symptoms. Carac Counseling:  I discussed with the patient the risks of Carac including but not limited to erythema, scaling, itching, weeping, crusting, and pain.

## 2023-05-02 ENCOUNTER — HOSPITAL ENCOUNTER (EMERGENCY)
Age: 23
Discharge: HOME | End: 2023-05-02
Payer: COMMERCIAL

## 2023-05-02 VITALS
RESPIRATION RATE: 16 BRPM | WEIGHT: 168 LBS | TEMPERATURE: 98.5 F | BODY MASS INDEX: 29.77 KG/M2 | DIASTOLIC BLOOD PRESSURE: 94 MMHG | SYSTOLIC BLOOD PRESSURE: 141 MMHG | OXYGEN SATURATION: 100 % | HEIGHT: 63 IN | HEART RATE: 77 BPM

## 2023-05-02 DIAGNOSIS — L03.211 FACIAL CELLULITIS: ICD-10-CM

## 2023-05-02 DIAGNOSIS — H10.022 PINK EYE, LEFT: Primary | ICD-10-CM

## 2023-05-02 PROCEDURE — 99213 OFFICE O/P EST LOW 20 MIN: CPT | Performed by: NURSE PRACTITIONER

## 2023-05-02 PROCEDURE — 99213 OFFICE O/P EST LOW 20 MIN: CPT

## 2023-05-02 RX ORDER — ERYTHROMYCIN 5 MG/G
OINTMENT OPHTHALMIC
Qty: 3.5 G | Refills: 0 | Status: SHIPPED | OUTPATIENT
Start: 2023-05-02

## 2023-05-02 RX ORDER — SULFAMETHOXAZOLE AND TRIMETHOPRIM 800; 160 MG/1; MG/1
1 TABLET ORAL 2 TIMES DAILY
Qty: 20 TABLET | Refills: 0 | Status: SHIPPED | OUTPATIENT
Start: 2023-05-02 | End: 2023-05-12

## 2023-05-02 ASSESSMENT — VISUAL ACUITY
OS: 20/30
OU: 20/25
OD: 20/40

## 2023-05-02 ASSESSMENT — ENCOUNTER SYMPTOMS
DIARRHEA: 0
ABDOMINAL PAIN: 0
VOMITING: 0
EYE ITCHING: 0
SHORTNESS OF BREATH: 0
EYE REDNESS: 1
NAUSEA: 0
COUGH: 0

## 2023-05-02 ASSESSMENT — PAIN - FUNCTIONAL ASSESSMENT
PAIN_FUNCTIONAL_ASSESSMENT: 0-10
PAIN_FUNCTIONAL_ASSESSMENT: PREVENTS OR INTERFERES SOME ACTIVE ACTIVITIES AND ADLS

## 2023-05-02 ASSESSMENT — PAIN DESCRIPTION - ORIENTATION: ORIENTATION: LEFT

## 2023-05-02 ASSESSMENT — PAIN DESCRIPTION - PAIN TYPE: TYPE: ACUTE PAIN

## 2023-05-02 ASSESSMENT — PAIN DESCRIPTION - LOCATION: LOCATION: EYE

## 2023-05-02 ASSESSMENT — PAIN SCALES - GENERAL: PAINLEVEL_OUTOF10: 6

## 2023-05-02 ASSESSMENT — PAIN DESCRIPTION - DESCRIPTORS: DESCRIPTORS: DISCOMFORT

## 2023-05-02 NOTE — ED TRIAGE NOTES
Patient ambulated to room and states she noticed discomfort in her left eye after she left the gym yesterday.  Denies injury but states it hurts in the outer corner of her left eye and swelling started today

## 2023-05-02 NOTE — ED PROVIDER NOTES
[address]       Chief Complaint   Patient presents with    Eye Problem     Left eye - outer corner hurts and swelling       Nurses Notes reviewed and I agree except as noted in the HPI. HISTORY OF PRESENT ILLNESS   Kirsten Roth is a 25 y.o. female who presents to the urgent care. She presents for evaluation of possible pink eye left eye, started last night. Upper eye lid is also swollen. The patient/patient representative has no other acute complaints at this time. REVIEW OF SYSTEMS     Review of Systems   Constitutional:  Negative for chills, fatigue and fever. Eyes:  Positive for redness. Negative for itching. Respiratory:  Negative for cough and shortness of breath. Cardiovascular:  Negative for chest pain. Gastrointestinal:  Negative for abdominal pain, diarrhea, nausea and vomiting. Skin:  Negative for rash. Allergic/Immunologic: Negative for environmental allergies and food allergies. Neurological:  Negative for headaches. Hematological:  Negative for adenopathy. PAST MEDICAL HISTORY         Diagnosis Date    Eczema     Psoriasis     Visual acuity reduced     poor visual acuity       SURGICAL HISTORY     Patient  has no past surgical history on file. CURRENT MEDICATIONS       Previous Medications    ACETAMINOPHEN (TYLENOL) 325 MG TABLET    Take 2 tablets by mouth every 6 hours as needed for Pain    IBUPROFEN (ADVIL;MOTRIN) 600 MG TABLET    Take 1 tablet by mouth 4 times daily as needed for Pain       ALLERGIES     Patient is is allergic to antibiotic [neomycin-polymyxin b gu] and dust mite extract. FAMILY HISTORY     Patient'sfamily history is not on file. SOCIAL HISTORY     Patient  reports that she has never smoked. She has been exposed to tobacco smoke. She has never used smokeless tobacco. She reports that she does not drink alcohol and does not use drugs.     PHYSICAL EXAM     ED TRIAGE VITALS

## 2023-09-28 ENCOUNTER — NURSE ONLY (OUTPATIENT)
Dept: LAB | Age: 23
End: 2023-09-28

## 2023-10-01 LAB
C TRACH RRNA SPEC QL NAA+PROBE: NEGATIVE
N GONORRHOEA RRNA SPEC QL NAA+PROBE: NEGATIVE
SPEC CONTAINER SPEC: NORMAL
SPECIMEN SOURCE: NORMAL
SPECIMEN SOURCE: NORMAL
T VAGINALIS RRNA SPEC QL NAA+PROBE: NEGATIVE

## 2023-11-24 ENCOUNTER — HOSPITAL ENCOUNTER (EMERGENCY)
Age: 23
Discharge: HOME | End: 2023-11-24
Payer: COMMERCIAL

## 2023-11-24 ENCOUNTER — APPOINTMENT (OUTPATIENT)
Dept: GENERAL RADIOLOGY | Age: 23
End: 2023-11-24
Payer: COMMERCIAL

## 2023-11-24 VITALS
SYSTOLIC BLOOD PRESSURE: 144 MMHG | TEMPERATURE: 97.8 F | OXYGEN SATURATION: 100 % | RESPIRATION RATE: 18 BRPM | WEIGHT: 160 LBS | HEIGHT: 64 IN | HEART RATE: 79 BPM | DIASTOLIC BLOOD PRESSURE: 97 MMHG | BODY MASS INDEX: 27.31 KG/M2

## 2023-11-24 DIAGNOSIS — S90.122A CONTUSION OF FIFTH TOE OF LEFT FOOT, INITIAL ENCOUNTER: Primary | ICD-10-CM

## 2023-11-24 PROCEDURE — 99213 OFFICE O/P EST LOW 20 MIN: CPT

## 2023-11-24 PROCEDURE — 99213 OFFICE O/P EST LOW 20 MIN: CPT | Performed by: NURSE PRACTITIONER

## 2023-11-24 PROCEDURE — 73630 X-RAY EXAM OF FOOT: CPT

## 2023-11-24 ASSESSMENT — ENCOUNTER SYMPTOMS: SHORTNESS OF BREATH: 0

## 2023-11-24 ASSESSMENT — PAIN - FUNCTIONAL ASSESSMENT: PAIN_FUNCTIONAL_ASSESSMENT: NONE - DENIES PAIN

## 2023-12-01 ENCOUNTER — HOSPITAL ENCOUNTER (EMERGENCY)
Age: 23
Discharge: HOME | End: 2023-12-01
Payer: COMMERCIAL

## 2023-12-01 VITALS
HEIGHT: 65 IN | DIASTOLIC BLOOD PRESSURE: 94 MMHG | WEIGHT: 170 LBS | SYSTOLIC BLOOD PRESSURE: 144 MMHG | TEMPERATURE: 97.1 F | RESPIRATION RATE: 16 BRPM | BODY MASS INDEX: 28.32 KG/M2 | OXYGEN SATURATION: 100 % | HEART RATE: 93 BPM

## 2023-12-01 DIAGNOSIS — M77.8 TENDINITIS OF FINGER OF RIGHT HAND: Primary | ICD-10-CM

## 2023-12-01 PROCEDURE — 99213 OFFICE O/P EST LOW 20 MIN: CPT | Performed by: NURSE PRACTITIONER

## 2023-12-01 PROCEDURE — 99213 OFFICE O/P EST LOW 20 MIN: CPT

## 2023-12-01 RX ORDER — MEDROXYPROGESTERONE ACETATE 150 MG/ML
INJECTION, SUSPENSION INTRAMUSCULAR
COMMUNITY
Start: 2023-09-29

## 2023-12-01 ASSESSMENT — PAIN DESCRIPTION - LOCATION: LOCATION: FINGER (COMMENT WHICH ONE)

## 2023-12-01 ASSESSMENT — PAIN DESCRIPTION - PAIN TYPE: TYPE: ACUTE PAIN

## 2023-12-01 ASSESSMENT — PAIN SCALES - GENERAL: PAINLEVEL_OUTOF10: 9

## 2023-12-01 ASSESSMENT — PAIN - FUNCTIONAL ASSESSMENT: PAIN_FUNCTIONAL_ASSESSMENT: 0-10

## 2023-12-01 ASSESSMENT — PAIN DESCRIPTION - ORIENTATION: ORIENTATION: RIGHT

## 2023-12-01 ASSESSMENT — ENCOUNTER SYMPTOMS: NAUSEA: 0

## 2023-12-01 ASSESSMENT — PAIN DESCRIPTION - DESCRIPTORS: DESCRIPTORS: STABBING

## 2023-12-01 ASSESSMENT — PAIN DESCRIPTION - ONSET: ONSET: ON-GOING

## 2023-12-01 ASSESSMENT — PAIN DESCRIPTION - FREQUENCY: FREQUENCY: CONTINUOUS

## 2023-12-01 NOTE — ED PROVIDER NOTES
[address]  Urgent Care Encounter      CHIEF COMPLAINT       Chief Complaint   Patient presents with    Finger Pain     nki       Nurses Notes reviewed and I agree except as noted in the HPI. HISTORY OF PRESENT ILLNESS   Cam Varma is a 26 y.o. female who presents for evaluation of right little finger pain. Onset of symptoms over the last 3 days, unchanged. No known injury. Patient complains of swelling, deformity. No weakness. No numbness/tingling. She is right-hand dominant. No improvement with current treatment. REVIEW OF SYSTEMS     Review of Systems   Constitutional:  Negative for fatigue and fever. Gastrointestinal:  Negative for nausea. Musculoskeletal:  Positive for arthralgias and joint swelling. Negative for neck pain and neck stiffness. Skin:  Negative for rash and wound. Neurological:  Negative for weakness and numbness. Hematological:  Does not bruise/bleed easily. PAST MEDICAL HISTORY         Diagnosis Date    Eczema     Psoriasis     Visual acuity reduced     poor visual acuity       SURGICAL HISTORY     Patient  has no past surgical history on file. CURRENT MEDICATIONS       Discharge Medication List as of 12/1/2023  3:23 PM        CONTINUE these medications which have NOT CHANGED    Details   medroxyPROGESTERone (DEPO-PROVERA) 150 MG/ML injection inject 1 milliliter intramuscularly every 3 monthsHistorical Med      erythromycin (ROMYCIN) 5 MG/GM ophthalmic ointment Apply 0.5 inch ribbon 4 times daily to the left eye for 5 days. , Disp-3.5 g, R-0, Normal      acetaminophen (TYLENOL) 325 MG tablet Take 2 tablets by mouth every 6 hours as needed for PainHistorical Med      ibuprofen (ADVIL;MOTRIN) 600 MG tablet Take 1 tablet by mouth 4 times daily as needed for Pain, Disp-20 tablet, R-0Normal             ALLERGIES     Patient is is allergic to antibiotic [neomycin-polymyxin b gu] and dust mite extract.     FAMILY HISTORY     Patient'sfamily

## 2024-02-12 ENCOUNTER — HOSPITAL ENCOUNTER (EMERGENCY)
Age: 24
Discharge: HOME | End: 2024-02-12
Payer: COMMERCIAL

## 2024-02-12 VITALS
DIASTOLIC BLOOD PRESSURE: 108 MMHG | WEIGHT: 140 LBS | RESPIRATION RATE: 18 BRPM | BODY MASS INDEX: 23.9 KG/M2 | TEMPERATURE: 98.4 F | SYSTOLIC BLOOD PRESSURE: 143 MMHG | HEIGHT: 64 IN | OXYGEN SATURATION: 99 % | HEART RATE: 76 BPM

## 2024-02-12 DIAGNOSIS — J02.9 ACUTE SORE THROAT: Primary | ICD-10-CM

## 2024-02-12 LAB — S PYO AG THROAT QL: NEGATIVE

## 2024-02-12 PROCEDURE — 99213 OFFICE O/P EST LOW 20 MIN: CPT | Performed by: NURSE PRACTITIONER

## 2024-02-12 PROCEDURE — 99213 OFFICE O/P EST LOW 20 MIN: CPT

## 2024-02-12 PROCEDURE — 87651 STREP A DNA AMP PROBE: CPT

## 2024-02-12 ASSESSMENT — PAIN - FUNCTIONAL ASSESSMENT: PAIN_FUNCTIONAL_ASSESSMENT: 0-10

## 2024-02-12 ASSESSMENT — PAIN DESCRIPTION - DESCRIPTORS: DESCRIPTORS: SORE

## 2024-02-12 ASSESSMENT — PAIN DESCRIPTION - LOCATION: LOCATION: THROAT

## 2024-02-12 ASSESSMENT — PAIN SCALES - GENERAL: PAINLEVEL_OUTOF10: 2

## 2024-02-12 NOTE — ED PROVIDER NOTES
[facility] URGENT CARE  UrgentCare Encounter      CHIEFCOMPLAINT       Chief Complaint   Patient presents with    Pharyngitis    Nasal Congestion       Nurses Notes reviewed and I agree except as noted in the HPI.  HISTORY OF PRESENT ILLNESS   Pearl Patel is a 23 y.o. female who presents to the urgent care for evaluation.     She complains of a sore throat that started 3 days ago. She has been taking hot tea with cough drops in it and using sore throat spray.     The patient/patient representative has no other acute complaints at this time.    REVIEW OF SYSTEMS     Review of Systems   Constitutional:  Negative for chills, fatigue and fever.   HENT:  Positive for sore throat. Negative for congestion, ear pain and sinus pressure.    Respiratory:  Negative for cough and shortness of breath.    Cardiovascular:  Negative for chest pain.   Gastrointestinal:  Negative for abdominal pain, diarrhea, nausea and vomiting.   Skin:  Negative for rash.   Allergic/Immunologic: Negative for environmental allergies and food allergies.   Neurological:  Negative for headaches.   Hematological:  Negative for adenopathy.       PAST MEDICAL HISTORY         Diagnosis Date    Eczema     Psoriasis     Visual acuity reduced     poor visual acuity       SURGICAL HISTORY     Patient  has no past surgical history on file.    CURRENT MEDICATIONS       Previous Medications    ACETAMINOPHEN (TYLENOL) 325 MG TABLET    Take 2 tablets by mouth every 6 hours as needed for Pain    ERYTHROMYCIN (ROMYCIN) 5 MG/GM OPHTHALMIC OINTMENT    Apply 0.5 inch ribbon 4 times daily to the left eye for 5 days.    IBUPROFEN (ADVIL;MOTRIN) 600 MG TABLET    Take 1 tablet by mouth 4 times daily as needed for Pain       ALLERGIES     Patient is is allergic to antibiotic [neomycin-polymyxin b gu] and dust mite extract.    FAMILY HISTORY     Patient'sfamily history is not on file.    SOCIAL HISTORY     Patient  reports that she has never smoked. She has  - CNP    Please note that some or all of this chart was generated using [employer] voice recognition software. Although every effort was made to ensure the accuracy of this automated transcription, some errors in transcription may have occurred.         Gabriela Hogan, LETY - CNP  02/12/24 0845

## 2024-10-30 ENCOUNTER — HOSPITAL ENCOUNTER (EMERGENCY)
Age: 24
Discharge: HOME | End: 2024-10-30
Payer: COMMERCIAL

## 2024-10-30 VITALS
TEMPERATURE: 97.4 F | OXYGEN SATURATION: 99 % | SYSTOLIC BLOOD PRESSURE: 124 MMHG | DIASTOLIC BLOOD PRESSURE: 86 MMHG | HEART RATE: 101 BPM | RESPIRATION RATE: 20 BRPM | BODY MASS INDEX: 29.88 KG/M2 | WEIGHT: 175 LBS | HEIGHT: 64 IN

## 2024-10-30 DIAGNOSIS — R11.2 NAUSEA VOMITING AND DIARRHEA: Primary | ICD-10-CM

## 2024-10-30 DIAGNOSIS — R19.7 NAUSEA VOMITING AND DIARRHEA: Primary | ICD-10-CM

## 2024-10-30 LAB
BILIRUB UR STRIP.AUTO-MCNC: ABNORMAL MG/DL
CHARACTER UR: CLEAR
COLOR, UA: YELLOW
GLUCOSE UR QL STRIP.AUTO: NEGATIVE MG/DL
KETONES UR QL STRIP.AUTO: ABNORMAL
NITRITE UR QL STRIP.AUTO: NEGATIVE
PH UR STRIP.AUTO: 6 [PH] (ref 5–9)
PROT UR STRIP.AUTO-MCNC: ABNORMAL MG/DL
RBC #/AREA URNS HPF: NEGATIVE /[HPF]
SP GR UR STRIP.AUTO: 1.02 (ref 1–1.03)
UROBILINOGEN, URINE: 4 EU/DL (ref 0.2–1)
WBC #/AREA URNS HPF: NEGATIVE /[HPF]

## 2024-10-30 PROCEDURE — 99213 OFFICE O/P EST LOW 20 MIN: CPT

## 2024-10-30 PROCEDURE — 99213 OFFICE O/P EST LOW 20 MIN: CPT | Performed by: NURSE PRACTITIONER

## 2024-10-30 PROCEDURE — 81003 URINALYSIS AUTO W/O SCOPE: CPT

## 2024-10-30 RX ORDER — LOPERAMIDE HYDROCHLORIDE 2 MG/1
2 CAPSULE ORAL 4 TIMES DAILY PRN
COMMUNITY
Start: 2024-10-30

## 2024-10-30 RX ORDER — ONDANSETRON 4 MG/1
4 TABLET, ORALLY DISINTEGRATING ORAL 3 TIMES DAILY PRN
Qty: 21 TABLET | Refills: 0 | Status: SHIPPED | OUTPATIENT
Start: 2024-10-30

## 2024-10-30 ASSESSMENT — ENCOUNTER SYMPTOMS
DIARRHEA: 1
ABDOMINAL PAIN: 1
BACK PAIN: 1
NAUSEA: 1

## 2024-10-30 ASSESSMENT — PAIN - FUNCTIONAL ASSESSMENT: PAIN_FUNCTIONAL_ASSESSMENT: NONE - DENIES PAIN

## 2024-10-30 NOTE — DISCHARGE INSTRUCTIONS
Medications as prescribed.    If your symptoms are not improving with medications I do recommend further evaluation in the emergency room.  You may need further testing and what is available in urgent care.

## 2024-10-30 NOTE — ED PROVIDER NOTES
[facility] URGENT CARE  UrgentCare Encounter      CHIEFCOMPLAINT       Chief Complaint   Patient presents with    Abdominal Pain     lower    Back Pain     lower    Headache    Diarrhea    Nausea       Nurses Notes reviewed and I agree except as noted in the HPI.  HISTORY OF PRESENT ILLNESS   Pearl Patel is a 24 y.o. female who presents to urgent care with complaints of abdominal pain, lower back pain, headache, diarrhea, nausea.  Symptom onset was approximately 2 days ago.    REVIEW OF SYSTEMS     Review of Systems   Gastrointestinal:  Positive for abdominal pain, diarrhea and nausea.   Musculoskeletal:  Positive for back pain.       PAST MEDICAL HISTORY         Diagnosis Date    Eczema     Psoriasis     Visual acuity reduced     poor visual acuity       SURGICAL HISTORY     Patient  has no past surgical history on file.    CURRENT MEDICATIONS       Discharge Medication List as of 10/30/2024 12:42 PM        CONTINUE these medications which have NOT CHANGED    Details   acetaminophen (TYLENOL) 325 MG tablet Take 2 tablets by mouth every 6 hours as needed for PainHistorical Med      Dyclonine-Glycerin (CEPACOL SORE THROAT SPRAY) 0.1-33 % LIQD Use according to package directions for sore throat., R-0OTC      ibuprofen (ADVIL;MOTRIN) 600 MG tablet Take 1 tablet by mouth 4 times daily as needed for Pain, Disp-20 tablet, R-0Normal             ALLERGIES     Patient is is allergic to antibiotic [neomycin-polymyxin b gu] and dust mite extract.    FAMILY HISTORY     Patient'sfamily history is not on file.    SOCIAL HISTORY     Patient  reports that she has never smoked. She has been exposed to tobacco smoke. She has never used smokeless tobacco. She reports that she does not drink alcohol and does not use drugs.    PHYSICAL EXAM     ED TRIAGE VITALS  BP: 124/86, Temp: 97.4 °F (36.3 °C), Pulse: (!) 101, Respirations: 20, SpO2: 99 %  Physical Exam  Vitals and nursing note reviewed.   Constitutional:

## 2024-10-30 NOTE — ED NOTES
Pt with complaints of lower abdominal pain that started 3 days ago, lower back pain, diarrhea, nausea and a headache that started 2 days ago. States she has tried pepto which helped. Denies eating any questionable food.     Bayron Johnson, BRIGID  10/30/24 [id number]

## 2025-01-04 ENCOUNTER — HOSPITAL ENCOUNTER (EMERGENCY)
Age: 25
Discharge: HOME | End: 2025-01-04
Payer: COMMERCIAL

## 2025-01-04 VITALS
TEMPERATURE: 98.4 F | OXYGEN SATURATION: 99 % | DIASTOLIC BLOOD PRESSURE: 95 MMHG | RESPIRATION RATE: 18 BRPM | HEART RATE: 82 BPM | SYSTOLIC BLOOD PRESSURE: 146 MMHG

## 2025-01-04 DIAGNOSIS — L02.419 AXILLARY ABSCESS: ICD-10-CM

## 2025-01-04 DIAGNOSIS — S46.912A LEFT SHOULDER STRAIN, INITIAL ENCOUNTER: Primary | ICD-10-CM

## 2025-01-04 PROCEDURE — 99213 OFFICE O/P EST LOW 20 MIN: CPT

## 2025-01-04 RX ORDER — CEPHALEXIN 500 MG/1
500 CAPSULE ORAL 2 TIMES DAILY
Qty: 14 CAPSULE | Refills: 0 | Status: SHIPPED | OUTPATIENT
Start: 2025-01-04 | End: 2025-01-11

## 2025-01-04 RX ORDER — NAPROXEN 500 MG/1
500 TABLET ORAL 2 TIMES DAILY WITH MEALS
Qty: 60 TABLET | Refills: 0 | Status: SHIPPED | OUTPATIENT
Start: 2025-01-04

## 2025-01-04 RX ORDER — CYCLOBENZAPRINE HCL 10 MG
10 TABLET ORAL 3 TIMES DAILY PRN
Qty: 21 TABLET | Refills: 0 | Status: SHIPPED | OUTPATIENT
Start: 2025-01-04 | End: 2025-01-14

## 2025-01-04 ASSESSMENT — ENCOUNTER SYMPTOMS
COUGH: 0
EYE REDNESS: 0
VOMITING: 0
DIARRHEA: 0
EYE DISCHARGE: 0
SHORTNESS OF BREATH: 0
TROUBLE SWALLOWING: 0
NAUSEA: 0
SORE THROAT: 0
RHINORRHEA: 0

## 2025-01-04 ASSESSMENT — PAIN DESCRIPTION - INTENSITY: RATING_2: 10

## 2025-01-04 ASSESSMENT — PAIN - FUNCTIONAL ASSESSMENT: PAIN_FUNCTIONAL_ASSESSMENT: 0-10

## 2025-01-04 ASSESSMENT — PAIN DESCRIPTION - ORIENTATION
ORIENTATION_2: LEFT
ORIENTATION: LEFT

## 2025-01-04 ASSESSMENT — PAIN DESCRIPTION - LOCATION: LOCATION: SHOULDER

## 2025-01-04 ASSESSMENT — PAIN DESCRIPTION - FREQUENCY: FREQUENCY: CONTINUOUS

## 2025-01-04 ASSESSMENT — PAIN DESCRIPTION - PAIN TYPE: TYPE: ACUTE PAIN

## 2025-01-04 ASSESSMENT — PAIN SCALES - GENERAL: PAINLEVEL_OUTOF10: 8

## 2025-01-04 ASSESSMENT — PAIN DESCRIPTION - ONSET: ONSET: ON-GOING

## 2025-01-04 NOTE — DISCHARGE INSTRUCTIONS
For abscesses in left axilla:  Prescribed cephalexin 500 mg twice daily for 7 days.  Continue placing warm compresses to that area 2-3 times daily to help body reabsorb or self express fluid.  If symptoms do not resolve after completion of oral antibiotics a I&D will be needed to drain them.    For left shoulder: Injury is likely soft tissue related.  Prescribed naproxen 500 mg twice daily for pain relief.  Take this medication with food.  To help reduce tension prescribed Flexeril 10 mg 3 times daily as needed.  This medication can cause severe drowsiness.  Do not take this medication operate heavy machinery or vehicle until you know how it makes you feel.    May take Tylenol in addition to naproxen for added pain relief.    Rest, ice.  Continue range of motion in the left shoulder.    Recommend following up with [facility] Monday through Friday from 8 AM to 4 PM if symptoms worsen or fail to improve in 3 to 5 days.

## 2025-01-04 NOTE — ED PROVIDER NOTES
[facility] URGENT CARE  Urgent Care Encounter      CHIEF COMPLAINT       Chief Complaint   Patient presents with    Shoulder Injury     LT    Abscess     LT axilla       Nurses Notes reviewed and I agree except as noted in the HPI.  HISTORY OF PRESENT ILLNESS   Pearl Patel is a 24 y.o. female who presents urgent care for evaluation of multiple issues.  Patient reports on December 29 she was helping move some furniture and believes that she somehow injured her left shoulder.  Reports she has pain from the top of the shoulder that radiated down to the elbow.  Rating discomfort and shoulder 8 out of 10.  Patient then also noticed approximately 2 days ago bump in her left axilla.  The bumps are very painful.  Reports she has been applying warm compress which is beneficial.  Denies any drainage    REVIEW OF SYSTEMS     Review of Systems   Constitutional:  Negative for chills, diaphoresis, fatigue and fever.   HENT:  Negative for congestion, ear pain, rhinorrhea, sore throat and trouble swallowing.    Eyes:  Negative for discharge and redness.   Respiratory:  Negative for cough and shortness of breath.    Cardiovascular:  Negative for chest pain.   Gastrointestinal:  Negative for diarrhea, nausea and vomiting.   Genitourinary:  Negative for decreased urine volume.   Musculoskeletal:  Positive for arthralgias. Negative for neck pain and neck stiffness.        Left shoulder pain   Skin:  Positive for wound. Negative for rash.   Neurological:  Negative for headaches.   Hematological:  Negative for adenopathy.   Psychiatric/Behavioral:  Negative for sleep disturbance.        PAST MEDICAL HISTORY         Diagnosis Date    Eczema     Psoriasis     Visual acuity reduced     poor visual acuity       SURGICAL HISTORY     Patient  has no past surgical history on file.    CURRENT MEDICATIONS       Discharge Medication List as of 1/4/2025 12:17 PM          ALLERGIES     Patient is is allergic to antibiotic  display  PROCEDURES:  None  FINAL IMPRESSION      1. Left shoulder strain, initial encounter    2. Axillary abscess        DISPOSITION/PLAN   DISPOSITION Decision To Discharge 01/04/2025 12:13:49 PM   DISPOSITION CONDITION Stable         Patient was seen and evaluated for above symptoms.  Assessment consistent with axillary abscesses and a left shoulder strain.  On evaluation abscesses in the left axilla very and fluctuant. For abscesses in left axilla:  Prescribed cephalexin 500 mg twice daily for 7 days.  Continue placing warm compresses to that area 2-3 times daily to help body reabsorb or self express fluid.  If symptoms do not resolve after completion of oral antibiotics a I&D will be needed to drain them.  Instructed patient to follow-up with primary if symptoms do not resolve. Patient initially able to lift left shoulder with no decreased range of motion when evaluating abscesses.  Patient became very resistant when assessing shoulder range of motion.  Imaging not obtained due to the likely soft tissue.  Injury is likely soft tissue related such as strain.  Prescribed naproxen 500 mg twice daily for pain relief.  Take this medication with food.  To help reduce tension prescribed Flexeril 10 mg 3 times daily as needed.  This medication can cause severe drowsiness.  Do not take this medication operate heavy machinery or vehicle until you know how it makes you feel.  Instructed May take Tylenol in addition to naproxen for added pain relief.  Instructed rest, ice.  Continue range of motion in the left shoulder. Recommend following up with [facility] walk-[facility] clinic Monday through Friday from 8 AM to 4 PM if symptoms worsen or fail to improve in 3 to 5 days.  Patient verbalized and agreed to plan of care.    PATIENT REFERRED TO:  Reid Mckee MD  [facility] [facility] [address] [phone]      As needed, If symptoms worsen    [facility]  [address] [facility]

## 2025-01-04 NOTE — ED TRIAGE NOTES
Pt presents to urgent care with c/o multiple issues. Pt states on 12/29/24, she was helping move furniture and believes she injured her LT shoulder. Pt states pain from top of shoulder radiating down to elbow. Pt states she also noticed a couple days ago a couple bumps in LT axilla. Pt states bumps are painful. Pt states she has been doing warm compresses, which helps some. Pt denies drainage.

## 2025-02-12 NOTE — ED TRIAGE NOTES
Pt to room 2 with symptoms that started on Thursday. She c/o head ache, abdominal cramping, outer right ear pain and neck pain. She will also need a work note due to missing work. She denies pain currently. Yes

## 2025-02-26 ENCOUNTER — HOSPITAL ENCOUNTER (EMERGENCY)
Age: 25
Discharge: HOME | End: 2025-02-26
Payer: COMMERCIAL

## 2025-02-26 VITALS
SYSTOLIC BLOOD PRESSURE: 144 MMHG | HEART RATE: 99 BPM | DIASTOLIC BLOOD PRESSURE: 87 MMHG | OXYGEN SATURATION: 97 % | TEMPERATURE: 98.8 F | RESPIRATION RATE: 18 BRPM

## 2025-02-26 DIAGNOSIS — J32.9 SINOBRONCHITIS: Primary | ICD-10-CM

## 2025-02-26 DIAGNOSIS — J40 SINOBRONCHITIS: Primary | ICD-10-CM

## 2025-02-26 LAB
FLUAV AG SPEC QL: NEGATIVE
FLUBV AG SPEC QL: NEGATIVE
S PYO AG THROAT QL: NEGATIVE

## 2025-02-26 PROCEDURE — 87804 INFLUENZA ASSAY W/OPTIC: CPT

## 2025-02-26 PROCEDURE — 87651 STREP A DNA AMP PROBE: CPT

## 2025-02-26 PROCEDURE — 99213 OFFICE O/P EST LOW 20 MIN: CPT

## 2025-02-26 RX ORDER — GUAIFENESIN, PSEUDOEPHEDRINE HYDROCHLORIDE 600; 60 MG/1; MG/1
1 TABLET, EXTENDED RELEASE ORAL EVERY 12 HOURS
Qty: 14 TABLET | Refills: 0 | Status: SHIPPED | OUTPATIENT
Start: 2025-02-26 | End: 2025-03-05

## 2025-02-26 RX ORDER — PREDNISONE 20 MG/1
20 TABLET ORAL 2 TIMES DAILY
Qty: 10 TABLET | Refills: 0 | Status: SHIPPED | OUTPATIENT
Start: 2025-02-26 | End: 2025-03-03

## 2025-02-26 ASSESSMENT — ENCOUNTER SYMPTOMS
EYE DISCHARGE: 0
SHORTNESS OF BREATH: 0
COUGH: 1
NAUSEA: 0
VOMITING: 0
TROUBLE SWALLOWING: 0
EYE REDNESS: 0
RHINORRHEA: 1
WHEEZING: 1
SORE THROAT: 1
DIARRHEA: 0

## 2025-02-26 ASSESSMENT — PAIN DESCRIPTION - FREQUENCY: FREQUENCY: INTERMITTENT

## 2025-02-26 ASSESSMENT — PAIN SCALES - GENERAL: PAINLEVEL_OUTOF10: 9

## 2025-02-26 ASSESSMENT — PAIN DESCRIPTION - LOCATION: LOCATION: THROAT

## 2025-02-26 ASSESSMENT — PAIN - FUNCTIONAL ASSESSMENT
PAIN_FUNCTIONAL_ASSESSMENT: ACTIVITIES ARE NOT PREVENTED
PAIN_FUNCTIONAL_ASSESSMENT: 0-10

## 2025-02-26 ASSESSMENT — PAIN DESCRIPTION - DESCRIPTORS: DESCRIPTORS: ACHING

## 2025-02-26 ASSESSMENT — PAIN DESCRIPTION - PAIN TYPE: TYPE: ACUTE PAIN

## 2025-02-26 NOTE — DISCHARGE INSTRUCTIONS
Negative Strep and Influenza.     Prescription for Augmentin, prednisone, and Mucinex D.  Drink plenty of fluids to thin mucus.  Sleep with your head propped up on a pillow.  Inhale steam three of four times daily( exp: sit in bathroom with hot shower running.)  Avoid Trigger and not smoking.  May also clean nasal passages with salt water to ease congestion. Use over-the-counter Tylenol and Motrin for pain or fever.  Follow-up with their PCP or [facility] in 3 to 5 days and worsening symptoms.

## 2025-02-26 NOTE — ED TRIAGE NOTES
Patient here with complaints of sore throat, cough, and congestion that started Saturday. States that she has tried OTC Sore throat spray, Day quil last dose was 0600 yesterday, and Nyquil last dose was 2100 yesterday. Flu and strep swab collected.

## 2025-02-26 NOTE — ED PROVIDER NOTES
[facility] URGENT CARE  Urgent Care Encounter      CHIEF COMPLAINT       Chief Complaint   Patient presents with    Pharyngitis    Cough    Nasal Congestion       Nurses Notes reviewed and I agree except as noted in the HPI.  HISTORY OF PRESENT ILLNESS   Pearl Patel is a 24 y.o. female who presents urgent care for evaluation of sore throat, cough and congestion.  Patient reports onset of symptoms on Saturday with a sore throat.  Reports the sore throat, continues but it was not until day 3 that she developed a lot of nasal congestion and cough.  Reports she has been taking over-the-counter cold and flu, DayQuil NyQuil for her symptoms.  Denies any known fevers.  Denies any chest tightness, does endorse and some difficulty breathing when laying flat.  Also mentions her fiancé told her this morning that she was wheezing last night in her sleep.  Denies any history of asthma.    REVIEW OF SYSTEMS     Review of Systems   Constitutional:  Negative for chills, diaphoresis, fatigue and fever.   HENT:  Positive for congestion, rhinorrhea and sore throat. Negative for ear pain and trouble swallowing.    Eyes:  Negative for discharge and redness.   Respiratory:  Positive for cough and wheezing. Negative for shortness of breath.    Cardiovascular:  Negative for chest pain.   Gastrointestinal:  Negative for diarrhea, nausea and vomiting.   Genitourinary:  Negative for decreased urine volume.   Musculoskeletal:  Negative for neck pain and neck stiffness.   Skin:  Negative for rash.   Neurological:  Negative for headaches.   Hematological:  Negative for adenopathy.   Psychiatric/Behavioral:  Negative for sleep disturbance.        PAST MEDICAL HISTORY         Diagnosis Date    Eczema     Psoriasis     Visual acuity reduced     poor visual acuity       SURGICAL HISTORY     Patient  has no past surgical history on file.    CURRENT MEDICATIONS       Previous Medications    NAPROXEN (NAPROSYN) 500 MG TABLET    Take 1 tablet

## 2025-05-30 NOTE — Clinical Note
Trish Hough was seen and treated in our emergency department on 5/19/2022. She may return to work on 05/21/2022. If you have any questions or concerns, please don't hesitate to call.       Ezekiel Goff, APRN - CNP
Family

## 2025-09-06 ENCOUNTER — HOSPITAL ENCOUNTER (EMERGENCY)
Age: 25
Discharge: HOME | End: 2025-09-06

## 2025-09-06 VITALS
DIASTOLIC BLOOD PRESSURE: 94 MMHG | HEART RATE: 92 BPM | TEMPERATURE: 97 F | RESPIRATION RATE: 20 BRPM | SYSTOLIC BLOOD PRESSURE: 135 MMHG | OXYGEN SATURATION: 98 %

## 2025-09-06 DIAGNOSIS — J06.9 VIRAL URI WITH COUGH: Primary | ICD-10-CM

## 2025-09-06 RX ORDER — FLUTICASONE PROPIONATE 50 MCG
2 SPRAY, SUSPENSION (ML) NASAL DAILY
Qty: 16 G | Refills: 0 | Status: SHIPPED | OUTPATIENT
Start: 2025-09-06

## 2025-09-06 RX ORDER — GUAIFENESIN, PSEUDOEPHEDRINE HYDROCHLORIDE 600; 60 MG/1; MG/1
1 TABLET, EXTENDED RELEASE ORAL EVERY 12 HOURS PRN
Qty: 14 TABLET | Refills: 1 | Status: SHIPPED | OUTPATIENT
Start: 2025-09-06 | End: 2025-09-20

## 2025-09-06 RX ORDER — CETIRIZINE HYDROCHLORIDE 10 MG/1
10 TABLET ORAL DAILY
Qty: 30 TABLET | Refills: 0 | Status: SHIPPED | OUTPATIENT
Start: 2025-09-06

## 2025-09-06 ASSESSMENT — ENCOUNTER SYMPTOMS
SHORTNESS OF BREATH: 0
NAUSEA: 0
CHEST TIGHTNESS: 0
VOMITING: 0
RHINORRHEA: 1
DIARRHEA: 0
SORE THROAT: 1
COUGH: 1

## 2025-09-06 ASSESSMENT — PAIN - FUNCTIONAL ASSESSMENT: PAIN_FUNCTIONAL_ASSESSMENT: 0-10

## 2025-09-06 ASSESSMENT — PAIN SCALES - GENERAL: PAINLEVEL_OUTOF10: 0
